# Patient Record
Sex: FEMALE | Race: WHITE | NOT HISPANIC OR LATINO | Employment: UNEMPLOYED | URBAN - METROPOLITAN AREA
[De-identification: names, ages, dates, MRNs, and addresses within clinical notes are randomized per-mention and may not be internally consistent; named-entity substitution may affect disease eponyms.]

---

## 2022-08-09 ENCOUNTER — TELEPHONE (OUTPATIENT)
Dept: MATERNAL FETAL MEDICINE | Facility: CLINIC | Age: 32
End: 2022-08-09
Payer: MEDICAID

## 2022-08-09 NOTE — TELEPHONE ENCOUNTER
Shanon returned call, confirmed appointment and directions, states FOB's carrier screen is pending. Had questions regarding frequency of occurrence - explained better answered by MFM MD than nurse at upcoming appointment. States understanding and agreement.

## 2022-08-09 NOTE — TELEPHONE ENCOUNTER
Attempted to return patient's call regarding FOB testing/result. No answer. Has MFM appointment scheduled for 8/29/22

## 2022-08-22 DIAGNOSIS — O09.92 HIGH-RISK PREGNANCY IN SECOND TRIMESTER: Primary | ICD-10-CM

## 2022-08-29 ENCOUNTER — OFFICE VISIT (OUTPATIENT)
Dept: MATERNAL FETAL MEDICINE | Facility: CLINIC | Age: 32
End: 2022-08-29
Payer: MEDICAID

## 2022-08-29 VITALS
WEIGHT: 230 LBS | OXYGEN SATURATION: 98 % | SYSTOLIC BLOOD PRESSURE: 112 MMHG | HEART RATE: 94 BPM | RESPIRATION RATE: 18 BRPM | HEIGHT: 68 IN | BODY MASS INDEX: 34.86 KG/M2 | DIASTOLIC BLOOD PRESSURE: 76 MMHG

## 2022-08-29 DIAGNOSIS — O09.92 HIGH-RISK PREGNANCY IN SECOND TRIMESTER: ICD-10-CM

## 2022-08-29 PROCEDURE — 3078F PR MOST RECENT DIASTOLIC BLOOD PRESSURE < 80 MM HG: ICD-10-PCS | Mod: CPTII,S$GLB,, | Performed by: OBSTETRICS & GYNECOLOGY

## 2022-08-29 PROCEDURE — 99204 OFFICE O/P NEW MOD 45 MIN: CPT | Mod: TH,S$GLB,, | Performed by: OBSTETRICS & GYNECOLOGY

## 2022-08-29 PROCEDURE — 3074F SYST BP LT 130 MM HG: CPT | Mod: CPTII,S$GLB,, | Performed by: OBSTETRICS & GYNECOLOGY

## 2022-08-29 PROCEDURE — 1159F MED LIST DOCD IN RCRD: CPT | Mod: CPTII,S$GLB,, | Performed by: OBSTETRICS & GYNECOLOGY

## 2022-08-29 PROCEDURE — 76811 OB US DETAILED SNGL FETUS: CPT | Mod: S$GLB,,, | Performed by: OBSTETRICS & GYNECOLOGY

## 2022-08-29 PROCEDURE — 3008F PR BODY MASS INDEX (BMI) DOCUMENTED: ICD-10-PCS | Mod: CPTII,S$GLB,, | Performed by: OBSTETRICS & GYNECOLOGY

## 2022-08-29 PROCEDURE — 3078F DIAST BP <80 MM HG: CPT | Mod: CPTII,S$GLB,, | Performed by: OBSTETRICS & GYNECOLOGY

## 2022-08-29 PROCEDURE — 3008F BODY MASS INDEX DOCD: CPT | Mod: CPTII,S$GLB,, | Performed by: OBSTETRICS & GYNECOLOGY

## 2022-08-29 PROCEDURE — 1159F PR MEDICATION LIST DOCUMENTED IN MEDICAL RECORD: ICD-10-PCS | Mod: CPTII,S$GLB,, | Performed by: OBSTETRICS & GYNECOLOGY

## 2022-08-29 PROCEDURE — 76811 PR US, OB FETAL EVAL & EXAM, TRANSABDOM,FIRST GESTATION: ICD-10-PCS | Mod: S$GLB,,, | Performed by: OBSTETRICS & GYNECOLOGY

## 2022-08-29 PROCEDURE — 3074F PR MOST RECENT SYSTOLIC BLOOD PRESSURE < 130 MM HG: ICD-10-PCS | Mod: CPTII,S$GLB,, | Performed by: OBSTETRICS & GYNECOLOGY

## 2022-08-29 PROCEDURE — 99204 PR OFFICE/OUTPT VISIT, NEW, LEVL IV, 45-59 MIN: ICD-10-PCS | Mod: TH,S$GLB,, | Performed by: OBSTETRICS & GYNECOLOGY

## 2022-08-29 RX ORDER — B-COMPLEX WITH VITAMIN C
TABLET ORAL
COMMUNITY
Start: 2022-08-18

## 2022-08-29 RX ORDER — ONDANSETRON 4 MG/1
TABLET, FILM COATED ORAL
COMMUNITY
Start: 2022-06-24

## 2022-08-29 NOTE — PROGRESS NOTES
"Shanon is here for initial M consultation, referred by Jr. Clifford  for + carrier screen.    She is not feeling fetal movement.    Shanon denies vaginal bleeding, loss of fluid, recurrent cramping, but does complain of periodic numbness in L thigh, ? Sciatic discomfort.      Vitals:    08/29/22 1233   BP: 112/76   Pulse: 94   Resp: 18   SpO2: 98%   Weight: 104.3 kg (230 lb)   Height: 5' 8" (1.727 m)      BMI:                    34.97 kg/m^2                 " Add 57574 Cpt? (Important Note: In 2017 The Use Of 71930 Is Being Tracked By Cms To Determine Future Global Period Reimbursement For Global Periods): yes Body Location Override (Optional): left medial cheek Follow Up Time Frame (Optional): months Detail Level: Detailed Wound Diameter In Cm(Optional): 0 Follow Up Units (Optional): 6 Patient To Follow-Up With?: our clinic Wound Edema?: mild Wound Crusting?: clean Sutures?: intact Wound Evaluated By (Optional): Abimbola Sultana Wound Color?: pink

## 2022-08-29 NOTE — LETTER
August 29, 2022        Rudolph Horton MD  [address] [facility] [address]             [place] - [facility] Fetal [facility]  [address]  [place] [address]  Phone: [phone]  Fax: [phone]   Patient: Shanon Aponte   MR Number: [id number]   YOB: 1990   Date of Visit: 8/29/2022       Dear Dr. Horton:    Thank you for referring Shanon Aponte to me for evaluation. Attached you will find relevant portions of my assessment and plan of care.    If you have questions, please do not hesitate to call me. I look forward to following Shanon Aponte along with you.    Sincerely,      Antoni Reed MD          CC  MD Yareli Laytonosure

## 2022-08-29 NOTE — PROGRESS NOTES
Initial maternal Fetal Medicine consultation note:    Reason for consultation:     1. Pregnancy at 18 weeks gestation  2. Abnormal maternal screening for both SMA and for fragile X syndrome  3. History of substance misuse; opioids and benzodiazepines.  Sober since inpatient rehab and December 2020    Dear Dr. Horton,       Today, I had the opportunity to see your patient, Ida Aponte, at the [facility].  Our team greatly appreciates your request for both imaging and consultation.  Your patient has had abnormal maternal screening and is referred for genetic counseling as well as imaging.    Past medical history:     Patient is healthy with no major medical illnesses such as hypertension, diabetes, cardiac disease, pulmonary disease, renal disease, or autoimmune disease.  She does have a history of drug misuse.  The patient does utilize both opioids and benzodiazepines.  She went to inpatient rehab.  She has been clean sent 2020.    Surgical history:     Dilation and curettage    Obstetric history:    The patient was pregnant early in 2022.  She had a 9 week miscarriage.    Family history:    Noncontributory.  Negative for congenital cardiac disease, open neural tube defect, aneuploidy, fragile X syndrome, and SMA.  The patient is a carrier for SMA.  The father pregnancy has been tested and is negative.  The patient is a pre mutation carrier for fragile X syndrome.  The premutation size is 60 triplets of CGG.    SOCIAL HISTORY:    Your patient has been sober and clean for over a year and a half.  She denies tobacco use and alcohol use as well    Review of systems:    The patient denies somatic complaints.  She has no history of vaginal bleeding, leakage of fluid, or uterine contractions.  She states she feels well.    Physical examination    Vital signs:  Blood pressure 112/76, pulse 94, respirations 18, weight 230 lb, pulse oximetry 98%   HEENT:  Grossly within normal limits    Abdomen:  Benign exam.  Uterus nontender to palpation  Extremities:  No ankle edema is palpable   Neuro: Grossly intact   Psych:  Appropriate for mood and affect     Imaging:     Imaging today was performed without issue.  However, secondary to fetal position some views were limited.  No obvious abnormality is seen.  Fetal growth is normal.  The fluid volume is normal.  All imaging is summarized in a full [facility] ultrasound report that will be sent separately for your review.    Counseling:     I counseled the patient and her partner for a extended period of time.  Our focus was mostly on the results of her carrier screening report.  There were 2 subjects that required counseling.      The patient is a carrier for spinal muscular atrophy.  I explained that type 1 spinal muscular atrophy, (also known as SMA) has historically been a very sad disease with childhood death around the age of 2.  I explained that in their case it will not be a problem as testing on the  is negative for carrier status.  I reviewed the inheritance of autosomal recessive diseases with the typical 4 quadrant diagram.  I pointed out that they would have a 50% chance of a baby with 2 normal genes, and a 50% chance of a baby that is a carrier.  They both expressed understanding.  Post counseling they had no questions regarding SMA.  It is my impression that they have a good understanding the clinical situation.      Next we discussed her carrier screening for fragile X syndrome.  The results show that the patient is a pre mutation carrier for fragile X syndrome.  The patient has a pre mutation size of 60 CGG repeat allele, and a normal size 30 allele.  I stated this was pre mutation and the chance of her son in utero being affected   I did state that amniocentesis was available for fetal testing.  It carries with it a small risk of miscarriage.  Post counseling they declined amniocentesis.  The patient specifically stated it would not change  "our management and she would accept her child with or without a genetic issue.  It again is my impression they are well informed and made a reasonable choice.    The report also indicates there can be late onset disease is for women with pre mutation carrier status for fragile X.  I reviewed the following with the couple as noted in the report, female pre mutation carriers have up to a 27% chance of fragile X associated primary ovarian insufficiency.  And also up to a 17% chance of developing a late onset neurological condition known as fragile X associated tremor/ataxia syndrome.  Occasionally, females with pre mutation may have issues related to attention span such as attention deficit disorder and some may have behavior problems, social anxiety, and or difficulty and social skills.  Complete genetic counseling and additional medical workup as clinically indicated should be considered."      I reviewed each of the issues with the patient.  I pointed out that if she has primary ovarian insufficiency she will go through early menopause and not be able to get pregnant.  She stated this is not a problem as she is going to have this 1 child..  Pointed out that the tremor and ataxia is late onset.  I suggest she have genetic counseling down the road if she wants to know more about those issues.  Some individuals however prefer to wait till the disease occurs if that is the case.  Each person must make their own decision.  Post counseling again I feel the patient has adequate understand the clinical situation.    Lastly, I counseled the patient that I did not think that her prior substance abuse had anything to do with these issues.  That is in her past and is not affecting this pregnancy in any way.      Impression/recommendations:     Due to the suboptimal imaging today we will see the patient back in 6 weeks and try to complete the anatomic survey.  Please refer to the body of the consultation above regarding " counseling regarding her positive prenatal screens.  Please call me if you have any question about the care your patient.      Sincerely, Antoni

## 2022-10-04 DIAGNOSIS — O09.92 HIGH-RISK PREGNANCY IN SECOND TRIMESTER: Primary | ICD-10-CM

## 2022-10-13 ENCOUNTER — PROCEDURE VISIT (OUTPATIENT)
Dept: MATERNAL FETAL MEDICINE | Facility: CLINIC | Age: 32
End: 2022-10-13
Payer: MEDICAID

## 2022-10-13 VITALS
SYSTOLIC BLOOD PRESSURE: 112 MMHG | BODY MASS INDEX: 35.58 KG/M2 | DIASTOLIC BLOOD PRESSURE: 72 MMHG | HEART RATE: 96 BPM | OXYGEN SATURATION: 97 % | RESPIRATION RATE: 18 BRPM | WEIGHT: 234 LBS

## 2022-10-13 DIAGNOSIS — O09.92 HIGH-RISK PREGNANCY IN SECOND TRIMESTER: ICD-10-CM

## 2022-10-13 PROCEDURE — 76816 OB US FOLLOW-UP PER FETUS: CPT | Mod: S$GLB,,, | Performed by: OBSTETRICS & GYNECOLOGY

## 2022-10-13 PROCEDURE — 99213 PR OFFICE/OUTPT VISIT, EST, LEVL III, 20-29 MIN: ICD-10-PCS | Mod: TH,S$GLB,, | Performed by: OBSTETRICS & GYNECOLOGY

## 2022-10-13 PROCEDURE — 76816 PR  US,PREGNANT UTERUS,F/U,TRANSABD APP: ICD-10-PCS | Mod: S$GLB,,, | Performed by: OBSTETRICS & GYNECOLOGY

## 2022-10-13 PROCEDURE — 99213 OFFICE O/P EST LOW 20 MIN: CPT | Mod: TH,S$GLB,, | Performed by: OBSTETRICS & GYNECOLOGY

## 2022-10-13 NOTE — LETTER
October 13, 2022        Rudolph Horton MD  [address] [facility] [address]             [place] - Maternal Fetal Medicine  [address]  [place] [address]  Phone: [phone]  Fax: [phone]   Patient: Shanon Aponte   MR Number: [id number]   YOB: 1990   Date of Visit: 10/13/2022       Dear Dr. Horton:    Thank you for referring Shanon Aponte to me for evaluation. Below are the relevant portions of my assessment and plan of care.            If you have questions, please do not hesitate to call me. I look forward to following Shanon along with you.    Sincerely,      Sadia Aguirre MD           CC  Sadia Aguirre MD

## 2022-10-13 NOTE — PROGRESS NOTES
Follow-up MFM Consultation  Referring provider: Dr. Clifford Lawson    Indications for referral:  1) Pregnancy at 24 weeks and 4 days  (EDC 1-29-23)  2) Completion of anatomic survey.    Dear Dr. Horton,  Thank you for your kind request for consultation and imaging of your patient at the [facility].   The patient was initially seen due to positive carrier screening. Anatomic survey performed at that time was suboptimal, so she returns today for completion of the anatomic survey.  There have been no changes in her history since her last visit here. She has no complaints today.      Physical Exam  Vital signs: 112/72, 96, 18  General: Age appearing female in no apparent distress.  ABDOMEN:  Gravid, soft, nontender  Uterus: Nontender, appropriate height for gestational age    ULTRASOUND FINDINGS:  A repeat ultrasound was performed. The fetus is cephalic. EFW of 748g is at the 52nd percentile.  The placenta is anterior.  Amniotic fluid is normal. There are no fetal structural malformations to extent of view, and the anatomy was adequately visualized.    IMPRESSION:     1) 24 week gestation  2) Reassuring fetal growth and anatomy    RECOMMENDATIONS/DISCUSSION:  The patient and her partner were made aware of the reassuring sonographic findings. No additional MFM follow-up is indicated.     Thank you for allowing us to participate in her care.  Please do not hesitate to call with questions.   -Sadia Aguirre MD

## 2022-10-13 NOTE — PROGRESS NOTES
Shanon is here for followup [facility] consultation for + carrier screen, referred by Dr. Clifford Jr.      She is feeling fetal movement.    Shanon denies vaginal bleeding, loss of fluid, recurrent contractions.    Vitals:    10/13/22 1119   BP: 112/72   Pulse: 96   Resp: 18   SpO2: 97%   Weight: 106.1 kg (234 lb)     BMI:                    35.58 kg/m^2

## 2023-01-26 LAB
A1 MICROGLOB PLACENTAL VAG QL: ABNORMAL
APPEARANCE, UA: ABNORMAL
BACTERIA SPEC CULT: ABNORMAL /HPF
BASOPHILS NFR BLD: 0.2 % (ref 0–3)
BILIRUB UR QL STRIP: NEGATIVE MG/DL
COLOR UR: ABNORMAL
EOSINOPHIL NFR BLD: 0.4 % (ref 1–3)
ERYTHROCYTE [DISTWIDTH] IN BLOOD BY AUTOMATED COUNT: 13.4 % (ref 12.5–18)
GLUCOSE (UA): NORMAL MG/DL
HCT VFR BLD AUTO: 38.5 % (ref 37–47)
HGB BLD-MCNC: 13.1 G/DL (ref 12–16)
HGB UR QL STRIP: 250 /UL
KETONES UR QL STRIP: ABNORMAL MG/DL
LEUKOCYTE ESTERASE UR QL STRIP: 25 /UL
LYMPHOCYTES NFR BLD: 19.7 % (ref 25–40)
MCH RBC QN AUTO: 29.6 PG (ref 27–31.2)
MCHC RBC AUTO-ENTMCNC: 34 G/DL (ref 31.8–35.4)
MCV RBC AUTO: 86.9 FL (ref 80–97)
MONOCYTES NFR BLD: 6.7 % (ref 1–15)
NEUTROPHILS # BLD AUTO: 8.68 10*3/UL (ref 1.8–7.7)
NEUTROPHILS NFR BLD: 72.1 % (ref 37–80)
NITRITE UR QL STRIP: NEGATIVE
NUCLEATED RED BLOOD CELLS: 0 %
PH UR STRIP: 6 PH (ref 5–9)
PLATELETS: 152 10*3/UL (ref 142–424)
PROT UR QL STRIP: 100 MG/DL
RBC # BLD AUTO: 4.43 10*6/UL (ref 4.2–5.4)
RBC #/AREA URNS HPF: ABNORMAL /HPF (ref 0–2)
SERVICE COMMENT 03: ABNORMAL
SP GR UR STRIP: 1.02 (ref 1–1.03)
SPECIMEN COLLECTION METHOD, URINE: ABNORMAL
SQUAMOUS EPITHELIAL, UA: ABNORMAL /LPF
UROBILINOGEN UR STRIP-ACNC: NORMAL MG/DL
WBC # BLD: 12 10*3/UL (ref 4.6–10.2)
WBC #/AREA URNS HPF: ABNORMAL /HPF (ref 0–5)

## 2023-01-27 ENCOUNTER — OUTSIDE PLACE OF SERVICE (OUTPATIENT)
Dept: OBSTETRICS AND GYNECOLOGY | Facility: CLINIC | Age: 33
End: 2023-01-27
Payer: MEDICAID

## 2023-01-27 LAB
BARBITURATE SCREEN URINE: NEGATIVE
BENZODIAZ UR QL SCN: NEGATIVE
BENZOYLECGONINE, URINE: NEGATIVE
CANNABINOID SCREEN URINE: NEGATIVE
CLARITHRO TITR SBT: NEGATIVE {TITER}
METHADONE UR QL SCN: NEGATIVE
OPIATES UR QL SCN: NEGATIVE
PCP UR QL SCN: NEGATIVE
PH, URINE (TOX): 6 (ref 5–8)
RPR: NON REACTIVE

## 2023-01-27 PROCEDURE — 59409 OBSTETRICAL CARE: CPT | Mod: 22,GB,, | Performed by: OBSTETRICS & GYNECOLOGY

## 2023-01-27 PROCEDURE — 59409 PR OBSTETRICAL CARE,VAG DELIV ONLY: ICD-10-PCS | Mod: 22,GB,, | Performed by: OBSTETRICS & GYNECOLOGY

## 2023-01-28 ENCOUNTER — OUTSIDE PLACE OF SERVICE (OUTPATIENT)
Dept: OBSTETRICS AND GYNECOLOGY | Facility: CLINIC | Age: 33
End: 2023-01-28
Payer: MEDICAID

## 2023-01-28 LAB
HCT VFR BLD AUTO: 30.6 % (ref 37–47)
HGB BLD-MCNC: 10.4 G/DL (ref 12–16)

## 2023-01-28 PROCEDURE — 99238 HOSP IP/OBS DSCHRG MGMT 30/<: CPT | Mod: ,,, | Performed by: OBSTETRICS & GYNECOLOGY

## 2023-01-28 PROCEDURE — 99238 PR HOSPITAL DISCHARGE DAY,<30 MIN: ICD-10-PCS | Mod: ,,, | Performed by: OBSTETRICS & GYNECOLOGY

## 2023-01-30 ENCOUNTER — TELEPHONE (OUTPATIENT)
Dept: OBSTETRICS AND GYNECOLOGY | Facility: CLINIC | Age: 33
End: 2023-01-30
Payer: MEDICAID

## 2023-01-30 NOTE — TELEPHONE ENCOUNTER
----- Message from Marsha Newby sent at 1/30/2023  1:16 PM CST -----  Contact: self  Was advised she needed to come in for a follow up - she was advised to be seen w/in 4-5 days. Please call back at [phone].

## 2023-02-02 ENCOUNTER — POSTPARTUM VISIT (OUTPATIENT)
Dept: OBSTETRICS AND GYNECOLOGY | Facility: CLINIC | Age: 33
End: 2023-02-02
Payer: MEDICAID

## 2023-02-02 VITALS
HEART RATE: 99 BPM | HEIGHT: 68 IN | WEIGHT: 235 LBS | DIASTOLIC BLOOD PRESSURE: 89 MMHG | BODY MASS INDEX: 35.61 KG/M2 | SYSTOLIC BLOOD PRESSURE: 124 MMHG

## 2023-02-02 PROCEDURE — 0503F POSTPARTUM CARE VISIT: CPT | Mod: CPTII,S$GLB,, | Performed by: OBSTETRICS & GYNECOLOGY

## 2023-02-02 PROCEDURE — 0503F PR POSTPARTUM CARE VISIT: ICD-10-PCS | Mod: CPTII,S$GLB,, | Performed by: OBSTETRICS & GYNECOLOGY

## 2023-02-02 NOTE — PROGRESS NOTES
Subjective:       Patient ID: Shanon Aponte is a 32 y.o. female.    Chief Complaint:  Postpartum Care (Pt acknowledged scant vaginal bleeding, pt is not currently bf'ing, pt denies any issues with PPD )      History of Present Illness  HPI  Patient is doing well. Not breastfeeding. Pain only with sitting and laying down when changing positions.    GYN & OB History  No LMP recorded.   Date of Last Pap: No result found    OB History    Para Term  AB Living   2 1 1   1 1   SAB IAB Ectopic Multiple Live Births           1      # Outcome Date GA Lbr Kain/2nd Weight Sex Delivery Anes PTL Lv   2 Term 23 39w5d  3.799 kg (8 lb 6 oz) M Vag-Spont EPI  BIANKA   1 AB  9w0d    SAB         Birth Comments: D&C       Review of Systems  Review of Systems   See above     Objective:    Physical Exam     Perineum healing well. No drainage   Assessment:      No diagnosis found.   S/p 3rd degree tear  S/p FAVD  Postpartum visit         Plan:      Postpartum visit. Healing well.